# Patient Record
Sex: FEMALE | Race: WHITE
[De-identification: names, ages, dates, MRNs, and addresses within clinical notes are randomized per-mention and may not be internally consistent; named-entity substitution may affect disease eponyms.]

---

## 2020-04-25 ENCOUNTER — HOSPITAL ENCOUNTER (EMERGENCY)
Dept: HOSPITAL 76 - ED | Age: 20
Discharge: HOME | End: 2020-04-25
Payer: COMMERCIAL

## 2020-04-25 VITALS — SYSTOLIC BLOOD PRESSURE: 106 MMHG | DIASTOLIC BLOOD PRESSURE: 70 MMHG

## 2020-04-25 DIAGNOSIS — R19.7: ICD-10-CM

## 2020-04-25 DIAGNOSIS — R11.2: Primary | ICD-10-CM

## 2020-04-25 LAB
ALBUMIN DIAFP-MCNC: 5.2 G/DL (ref 3.2–5.5)
ALBUMIN/GLOB SERPL: 2.2 {RATIO} (ref 1–2.2)
ALP SERPL-CCNC: 52 IU/L (ref 42–121)
ALT SERPL W P-5'-P-CCNC: 13 IU/L (ref 10–60)
AMPHET UR QL SCN: NEGATIVE
ANION GAP SERPL CALCULATED.4IONS-SCNC: 16 MMOL/L (ref 6–13)
AST SERPL W P-5'-P-CCNC: 18 IU/L (ref 10–42)
BASOPHILS NFR BLD AUTO: 0 10^3/UL (ref 0–0.1)
BASOPHILS NFR BLD AUTO: 0.2 %
BENZODIAZ UR QL SCN: NEGATIVE
BILIRUB BLD-MCNC: 1.5 MG/DL (ref 0.2–1)
BILIRUB UR QL CFM: NEGATIVE
BUN SERPL-MCNC: 13 MG/DL (ref 6–20)
CALCIUM UR-MCNC: 9.5 MG/DL (ref 8.5–10.3)
CHLORIDE SERPL-SCNC: 103 MMOL/L (ref 101–111)
CLARITY UR REFRACT.AUTO: CLEAR
CO2 SERPL-SCNC: 20 MMOL/L (ref 21–32)
COCAINE UR-SCNC: NEGATIVE UMOL/L
CREAT SERPLBLD-SCNC: 0.6 MG/DL (ref 0.4–1)
EOSINOPHIL # BLD AUTO: 0 10^3/UL (ref 0–0.7)
EOSINOPHIL NFR BLD AUTO: 0.1 %
ERYTHROCYTE [DISTWIDTH] IN BLOOD BY AUTOMATED COUNT: 11.9 % (ref 12–15)
GLOBULIN SER-MCNC: 2.4 G/DL (ref 2.1–4.2)
GLUCOSE SERPL-MCNC: 96 MG/DL (ref 70–100)
GLUCOSE UR QL STRIP.AUTO: NEGATIVE MG/DL
HCG UR QL: NEGATIVE
HGB UR QL STRIP: 13.7 G/DL (ref 12–16)
KETONES UR QL STRIP.AUTO: >=80 MG/DL
LIPASE SERPL-CCNC: 17 U/L (ref 22–51)
LYMPHOCYTES # SPEC AUTO: 1.4 10^3/UL (ref 1.5–3.5)
LYMPHOCYTES NFR BLD AUTO: 15.7 %
MCH RBC QN AUTO: 30.3 PG (ref 27–31)
MCHC RBC AUTO-ENTMCNC: 34.8 G/DL (ref 32–36)
MCV RBC AUTO: 87.2 FL (ref 81–99)
METHADONE UR QL SCN: NEGATIVE
METHAMPHET UR QL SCN: NEGATIVE
MONOCYTES # BLD AUTO: 0.4 10^3/UL (ref 0–1)
MONOCYTES NFR BLD AUTO: 4.3 %
NEUTROPHILS # BLD AUTO: 6.9 10^3/UL (ref 1.5–6.6)
NEUTROPHILS # SNV AUTO: 8.7 X10^3/UL (ref 4.8–10.8)
NEUTROPHILS NFR BLD AUTO: 79.4 %
NITRITE UR QL STRIP.AUTO: NEGATIVE
OPIATES UR QL SCN: NEGATIVE
PDW BLD AUTO: 10.1 FL (ref 7.9–10.8)
PH UR STRIP.AUTO: 6 PH (ref 5–7.5)
PLATELET # BLD: 186 10^3/UL (ref 130–450)
PROT SPEC-MCNC: 7.6 G/DL (ref 6.7–8.2)
PROT UR STRIP.AUTO-MCNC: NEGATIVE MG/DL
RBC # UR STRIP.AUTO: NEGATIVE /UL
RBC MAR: 4.52 10^6/UL (ref 4.2–5.4)
SODIUM SERPLBLD-SCNC: 139 MMOL/L (ref 135–145)
SP GR UR STRIP.AUTO: 1.02 (ref 1–1.03)
UROBILINOGEN UR QL STRIP.AUTO: (no result) E.U./DL
UROBILINOGEN UR STRIP.AUTO-MCNC: NEGATIVE MG/DL
VOLATILE DRUGS POS SERPL SCN: (no result)

## 2020-04-25 PROCEDURE — 36415 COLL VENOUS BLD VENIPUNCTURE: CPT

## 2020-04-25 PROCEDURE — 81001 URINALYSIS AUTO W/SCOPE: CPT

## 2020-04-25 PROCEDURE — 80053 COMPREHEN METABOLIC PANEL: CPT

## 2020-04-25 PROCEDURE — 83690 ASSAY OF LIPASE: CPT

## 2020-04-25 PROCEDURE — 99284 EMERGENCY DEPT VISIT MOD MDM: CPT

## 2020-04-25 PROCEDURE — 81025 URINE PREGNANCY TEST: CPT

## 2020-04-25 PROCEDURE — 96375 TX/PRO/DX INJ NEW DRUG ADDON: CPT

## 2020-04-25 PROCEDURE — 80306 DRUG TEST PRSMV INSTRMNT: CPT

## 2020-04-25 PROCEDURE — 96365 THER/PROPH/DIAG IV INF INIT: CPT

## 2020-04-25 PROCEDURE — 87086 URINE CULTURE/COLONY COUNT: CPT

## 2020-04-25 PROCEDURE — 81003 URINALYSIS AUTO W/O SCOPE: CPT

## 2020-04-25 PROCEDURE — 85025 COMPLETE CBC W/AUTO DIFF WBC: CPT

## 2020-04-25 PROCEDURE — 99283 EMERGENCY DEPT VISIT LOW MDM: CPT

## 2020-04-25 NOTE — ED PHYSICIAN DOCUMENTATION
PD HPI ABD PAIN





- Stated complaint


Stated Complaint: N/V/D/ABD PX





- Chief complaint


Chief Complaint: Abd Pain





- History obtained from


History obtained from: Patient





- History of Present Illness


Timing - onset: Other (19-year-old woman has been sick for about 4 days with 

vomiting and diarrhea.  No significant abdominal pain.  No headaches.  She also 

notes this is making worse some chronic weight loss that she has had since she 

had a depressive episode which the depression has resolved but her weight has 

not come back.)





Review of Systems


Constitutional: denies: Fever, Chills


Nose: denies: Rhinorrhea / runny nose, Congestion


Throat: denies: Sore throat


Cardiac: denies: Chest pain / pressure, Palpitations


Respiratory: denies: Dyspnea





PD PAST MEDICAL HISTORY





- Present Medications


Home Medications: 


                                Ambulatory Orders











 Medication  Instructions  Recorded  Confirmed


 


Ondansetron Odt [Zofran] 4 mg TL Q6H PRN #10 tablet 04/25/20 














- Allergies


Allergies/Adverse Reactions: 


                                    Allergies











Allergy/AdvReac Type Severity Reaction Status Date / Time


 


No Known Drug Allergies Allergy   Verified 04/25/20 16:21














PD ED PE NORMAL





- Vitals


Vital signs reviewed: Yes





- General


General: Alert and oriented X 3, No acute distress





- HEENT


HEENT: PERRL, Pharynx benign





- Neck


Neck: Supple, no meningeal sign, No bony TTP





- Cardiac


Cardiac: RRR, No murmur





- Respiratory


Respiratory: No respiratory distress, Clear bilaterally





- Abdomen


Abdomen: Normal bowel sounds, Soft, Non tender





- Back


Back: No CVA TTP, No spinal TTP





- Derm


Derm: Normal color, Warm and dry





- Extremities


Extremities: No edema, No calf tenderness / cord





- Neuro


Neuro: Alert and oriented X 3, Normal speech





Results





- Vitals


Vitals: 


                               Vital Signs - 24 hr











  04/25/20 04/25/20





  16:14 18:55


 


Temperature 37.2 C 36.7 C


 


Heart Rate 135 H 97


 


Respiratory 18 18





Rate  


 


Blood Pressure 125/98 H 107/69


 


O2 Saturation 96 100








                                     Oxygen











O2 Source                      Room air

















- Labs


Labs: 


                                Laboratory Tests











  04/25/20 04/25/20 04/25/20





  16:47 16:47 16:55


 


WBC    8.7


 


RBC    4.52


 


Hgb    13.7


 


Hct    39.4


 


MCV    87.2


 


MCH    30.3


 


MCHC    34.8


 


RDW    11.9 L


 


Plt Count    186


 


MPV    10.1


 


Neut # (Auto)    6.9 H


 


Lymph # (Auto)    1.4 L


 


Mono # (Auto)    0.4


 


Eos # (Auto)    0.0


 


Baso # (Auto)    0.0


 


Absolute Nucleated RBC    0.00


 


Nucleated RBC %    0.0


 


Sodium   


 


Potassium   


 


Chloride   


 


Carbon Dioxide   


 


Anion Gap   


 


BUN   


 


Creatinine   


 


Estimated GFR (MDRD)   


 


Glucose   


 


Calcium   


 


Total Bilirubin   


 


AST   


 


ALT   


 


Alkaline Phosphatase   


 


Total Protein   


 


Albumin   


 


Globulin   


 


Albumin/Globulin Ratio   


 


Lipase   


 


Urine Color   DARK YELLOW 


 


Urine Clarity   CLEAR 


 


Urine pH   6.0 


 


Ur Specific Gravity   1.025 


 


Urine Protein   NEGATIVE 


 


Urine Glucose (UA)   NEGATIVE 


 


Urine Ketones   >=80 H 


 


Urine Occult Blood   NEGATIVE 


 


Urine Nitrite   NEGATIVE 


 


Urine Bilirubin   NEGATIVE 


 


Urine Urobilinogen   0.2 (NORMAL) 


 


Ur Leukocyte Esterase   NEGATIVE 


 


Ur Microscopic Review   NOT INDICATED 


 


Urine Culture Comments   NOT INDICATED 


 


Urine HCG, Qual   NEGATIVE 


 


Urine Opiates Screen  NEGATIVE  


 


Ur Oxycodone Screen  NEGATIVE  


 


Urine Methadone Screen  NEGATIVE  


 


Ur Propoxyphene Screen  NEGATIVE  


 


Ur Barbiturates Screen  NEGATIVE  


 


Ur Tricyclics Screen  NEGATIVE  


 


Ur Phencyclidine Scrn  NEGATIVE  


 


Ur Amphetamine Screen  NEGATIVE  


 


U Methamphetamines Scrn  NEGATIVE  


 


U Benzodiazepines Scrn  NEGATIVE  


 


Urine Cocaine Screen  NEGATIVE  


 


U Cannabinoids Screen  POSITIVE H  














  04/25/20





  16:55


 


WBC 


 


RBC 


 


Hgb 


 


Hct 


 


MCV 


 


MCH 


 


MCHC 


 


RDW 


 


Plt Count 


 


MPV 


 


Neut # (Auto) 


 


Lymph # (Auto) 


 


Mono # (Auto) 


 


Eos # (Auto) 


 


Baso # (Auto) 


 


Absolute Nucleated RBC 


 


Nucleated RBC % 


 


Sodium  139


 


Potassium  3.6


 


Chloride  103


 


Carbon Dioxide  20 L


 


Anion Gap  16.0 H


 


BUN  13


 


Creatinine  0.6


 


Estimated GFR (MDRD)  127


 


Glucose  96


 


Calcium  9.5


 


Total Bilirubin  1.5 H


 


AST  18


 


ALT  13


 


Alkaline Phosphatase  52


 


Total Protein  7.6


 


Albumin  5.2


 


Globulin  2.4


 


Albumin/Globulin Ratio  2.2


 


Lipase  17 L


 


Urine Color 


 


Urine Clarity 


 


Urine pH 


 


Ur Specific Gravity 


 


Urine Protein 


 


Urine Glucose (UA) 


 


Urine Ketones 


 


Urine Occult Blood 


 


Urine Nitrite 


 


Urine Bilirubin 


 


Urine Urobilinogen 


 


Ur Leukocyte Esterase 


 


Ur Microscopic Review 


 


Urine Culture Comments 


 


Urine HCG, Qual 


 


Urine Opiates Screen 


 


Ur Oxycodone Screen 


 


Urine Methadone Screen 


 


Ur Propoxyphene Screen 


 


Ur Barbiturates Screen 


 


Ur Tricyclics Screen 


 


Ur Phencyclidine Scrn 


 


Ur Amphetamine Screen 


 


U Methamphetamines Scrn 


 


U Benzodiazepines Scrn 


 


Urine Cocaine Screen 


 


U Cannabinoids Screen 














PD MEDICAL DECISION MAKING





- ED course


ED course: 





She uses marijuana daily mostly at night.  Discussed with her that that may be 

the cause of the chronic weight loss and she should abstain for a little bit.  

Otherwise her current illness seems like a kind of run-of-the-mill 

gastroenteritis, she is tachycardic and will receive IV fluids and antiemetics.





19-year-old woman presents with what sounds like gastroenteritis with nausea and

 vomiting and diarrhea.  She is a daily marijuana user and there may be some 

element of cannabinoid hyperemesis, or that seems less consistent with her 

syndrome since she really has no significant abdominal pain.  She had partial 

relief with Zofran and then complete relief of her nausea with Phenergan but did

 develop an akathisia after that requiring a little bit of Ativan.  Passed an 

oral challenge after that.





Departure





- Departure


Disposition: 01 Home, Self Care


Clinical Impression: 


Vomiting


Qualifiers:


 Vomiting type: unspecified Vomiting Intractability: non-intractable Nausea 

presence: with nausea Qualified Code(s): R11.2 - Nausea with vomiting, 

unspecified





Diarrhea


Qualifiers:


 Diarrhea type: presumed infectious Qualified Code(s): R19.7 - Diarrhea, 

unspecified





Condition: Good


Record reviewed to determine appropriate education?: Yes


Instructions:  ED Diarrhea Viral, ED Nausea Vomiting


Prescriptions: 


Ondansetron Odt [Zofran] 4 mg TL Q6H PRN #10 tablet


 PRN Reason: Nausea / Vomiting


Comments: 


Return if not better in 24 to 48 hours, anytime if worse, if pain is severe, or 

if you run a high fever.

## 2020-04-27 ENCOUNTER — HOSPITAL ENCOUNTER (EMERGENCY)
Dept: HOSPITAL 76 - ED | Age: 20
Discharge: HOME | End: 2020-04-27
Payer: COMMERCIAL

## 2020-04-27 VITALS — DIASTOLIC BLOOD PRESSURE: 69 MMHG | SYSTOLIC BLOOD PRESSURE: 101 MMHG

## 2020-04-27 DIAGNOSIS — K52.9: ICD-10-CM

## 2020-04-27 DIAGNOSIS — R11.2: ICD-10-CM

## 2020-04-27 DIAGNOSIS — E86.0: Primary | ICD-10-CM

## 2020-04-27 LAB
ALBUMIN DIAFP-MCNC: 5.6 G/DL (ref 3.2–5.5)
ALBUMIN/GLOB SERPL: 2.3 {RATIO} (ref 1–2.2)
ALP SERPL-CCNC: 50 IU/L (ref 42–121)
ALT SERPL W P-5'-P-CCNC: 13 IU/L (ref 10–60)
ANION GAP SERPL CALCULATED.4IONS-SCNC: 15 MMOL/L (ref 6–13)
AST SERPL W P-5'-P-CCNC: 18 IU/L (ref 10–42)
BASOPHILS NFR BLD AUTO: 0 10^3/UL (ref 0–0.1)
BASOPHILS NFR BLD AUTO: 0.4 %
BILIRUB BLD-MCNC: 1.6 MG/DL (ref 0.2–1)
BILIRUB UR QL CFM: NEGATIVE
BUN SERPL-MCNC: 10 MG/DL (ref 6–20)
CALCIUM UR-MCNC: 9.5 MG/DL (ref 8.5–10.3)
CHLORIDE SERPL-SCNC: 102 MMOL/L (ref 101–111)
CLARITY UR REFRACT.AUTO: CLEAR
CO2 SERPL-SCNC: 19 MMOL/L (ref 21–32)
CREAT SERPLBLD-SCNC: 0.6 MG/DL (ref 0.4–1)
EOSINOPHIL # BLD AUTO: 0 10^3/UL (ref 0–0.7)
EOSINOPHIL NFR BLD AUTO: 0.2 %
ERYTHROCYTE [DISTWIDTH] IN BLOOD BY AUTOMATED COUNT: 11.9 % (ref 12–15)
GLOBULIN SER-MCNC: 2.4 G/DL (ref 2.1–4.2)
GLUCOSE SERPL-MCNC: 78 MG/DL (ref 70–100)
GLUCOSE UR QL STRIP.AUTO: NEGATIVE MG/DL
HCG UR QL: NEGATIVE
HGB UR QL STRIP: 13.7 G/DL (ref 12–16)
KETONES UR QL STRIP.AUTO: >=80 MG/DL
LIPASE SERPL-CCNC: 19 U/L (ref 22–51)
LYMPHOCYTES # SPEC AUTO: 1.4 10^3/UL (ref 1.5–3.5)
LYMPHOCYTES NFR BLD AUTO: 28.2 %
MCH RBC QN AUTO: 30.4 PG (ref 27–31)
MCHC RBC AUTO-ENTMCNC: 34.7 G/DL (ref 32–36)
MCV RBC AUTO: 87.6 FL (ref 81–99)
MONOCYTES # BLD AUTO: 0.3 10^3/UL (ref 0–1)
MONOCYTES NFR BLD AUTO: 6.9 %
NEUTROPHILS # BLD AUTO: 3.2 10^3/UL (ref 1.5–6.6)
NEUTROPHILS # SNV AUTO: 5 X10^3/UL (ref 4.8–10.8)
NEUTROPHILS NFR BLD AUTO: 63.9 %
NITRITE UR QL STRIP.AUTO: NEGATIVE
PDW BLD AUTO: 10.4 FL (ref 7.9–10.8)
PH UR STRIP.AUTO: 6 PH (ref 5–7.5)
PLATELET # BLD: 178 10^3/UL (ref 130–450)
PROT SPEC-MCNC: 8 G/DL (ref 6.7–8.2)
PROT UR STRIP.AUTO-MCNC: (no result) MG/DL
RBC # UR STRIP.AUTO: (no result) /UL
RBC MAR: 4.51 10^6/UL (ref 4.2–5.4)
SODIUM SERPLBLD-SCNC: 136 MMOL/L (ref 135–145)
SP GR UR STRIP.AUTO: >=1.03 (ref 1–1.03)
UROBILINOGEN UR QL STRIP.AUTO: (no result) E.U./DL
UROBILINOGEN UR STRIP.AUTO-MCNC: NEGATIVE MG/DL

## 2020-04-27 PROCEDURE — 85025 COMPLETE CBC W/AUTO DIFF WBC: CPT

## 2020-04-27 PROCEDURE — 81001 URINALYSIS AUTO W/SCOPE: CPT

## 2020-04-27 PROCEDURE — 81003 URINALYSIS AUTO W/O SCOPE: CPT

## 2020-04-27 PROCEDURE — 83690 ASSAY OF LIPASE: CPT

## 2020-04-27 PROCEDURE — 96375 TX/PRO/DX INJ NEW DRUG ADDON: CPT

## 2020-04-27 PROCEDURE — 74177 CT ABD & PELVIS W/CONTRAST: CPT

## 2020-04-27 PROCEDURE — 96361 HYDRATE IV INFUSION ADD-ON: CPT

## 2020-04-27 PROCEDURE — 36415 COLL VENOUS BLD VENIPUNCTURE: CPT

## 2020-04-27 PROCEDURE — 99284 EMERGENCY DEPT VISIT MOD MDM: CPT

## 2020-04-27 PROCEDURE — 81025 URINE PREGNANCY TEST: CPT

## 2020-04-27 PROCEDURE — 87086 URINE CULTURE/COLONY COUNT: CPT

## 2020-04-27 PROCEDURE — 96374 THER/PROPH/DIAG INJ IV PUSH: CPT

## 2020-04-27 PROCEDURE — 80053 COMPREHEN METABOLIC PANEL: CPT

## 2020-04-27 RX ADMIN — SODIUM CHLORIDE STA MG: 9 INJECTION, SOLUTION INTRAVENOUS at 18:33

## 2020-04-27 RX ADMIN — SODIUM CHLORIDE STA MLS/HR: 9 INJECTION, SOLUTION INTRAVENOUS at 18:33

## 2020-04-27 RX ADMIN — ONDANSETRON STA MG: 2 INJECTION INTRAMUSCULAR; INTRAVENOUS at 18:33

## 2020-04-27 RX ADMIN — SODIUM CHLORIDE STA MLS/HR: 9 INJECTION, SOLUTION INTRAVENOUS at 19:38

## 2020-04-27 RX ADMIN — IOVERSOL ONE ML: 678 INJECTION INTRA-ARTERIAL; INTRAVENOUS at 18:50

## 2020-04-27 RX ADMIN — SODIUM CHLORIDE ONE MLS/HR: 9 INJECTION, SOLUTION INTRAVENOUS at 18:55

## 2020-04-27 NOTE — CT REPORT
Reason:  diffuse abd pain, vomiting x 6 days

Procedure Date:  04/27/2020   

Accession Number:  [id number] / [id number]                    

Procedure:  CT  - Abdomen/Pelvis W CPT Code:  

 

***Final Report***

 

 

FULL RESULT:

 

 

EXAM: CT ABDOMEN AND PELVIS

 

EXAM DATE: 4/27/2020 06:46 PM.

 

CLINICAL HISTORY: Diffuse abd pain, vomiting x 6 days.

 

COMPARISONS: None.

 

TECHNIQUE: Routine helical CT imaging was performed through the abdomen 

and pelvis. IV contrast: OPTIRAY 320. Enteric contrast: No. 

Reconstructions: Coronal and sagittal.

 

In accordance with CT protocol optimization, one or more of the following 

dose reduction techniques were utilized for this exam: automated exposure 

control, adjustment of mA and/or KV based on patient size, or use of 

iterative reconstructive technique.

 

FINDINGS:

 

Lung Bases: Unremarkable.

 

Liver: Normal. No masses.

 

Gallbladder/Bile Ducts: Unremarkable.

 

Spleen: Normal.

 

Pancreas: Normal.

 

Adrenal Glands: Normal.

 

Kidneys: Normal. No masses or hydronephrosis.

 

Peritoneal Cavity/Bowel: Suggestion of submucosal edema and colonic wall 

thickening involving mid ascending colon with mild pericolonic fat 

stranding (image 16 on series 5). Findings suggestive of colitis. 

Differentials include ulcerative colitis versus 

infectious/pseudomembranous colitis.

 

 Appendix not well seen, however there is no inflammation in right lower 

quadrant.

 

Pelvic Organs: Normal. The bladder and visualized pelvic organs are 

within normal limits.

 

Vasculature: No aneurysms or other significant abnormality.

 

Bones: No significant abnormality.

 

Other: None.

IMPRESSION: Suggestion of submucosal edema and colonic wall thickening 

involving mid ascending colon with mild pericolonic fat stranding, 

suggestive of colitis. Differentials include ulcerative colitis versus 

infectious/pseudomembranous colitis.

 

[facility]

## 2020-04-27 NOTE — ED PHYSICIAN DOCUMENTATION
History of Present Illness





- Stated complaint


Stated Complaint: N/V





- Chief complaint


Chief Complaint: Abd Pain





- History obtained from


History obtained from: Patient





- History of Present Illness


Timing: How many days ago (6)


Pain level max: 4


Pain level now: 3





- Additonal information


Additional information: 





19-year-old female presents to the emergency department with vomiting for the 

past 6 days.  She has had some diarrhea as well.  Zofran does seem to help 

mildly, but is still having difficulty keeping things down.  No recent travel.  

No recent antibiotics.  No history of inflammatory bowel disease in the family. 

Denies any possibility of pregnancy.  No fevers. She states that she utilizes 

marijuana approximately once per month





Review of Systems


Ten Systems: 10 systems reviewed and negative


Constitutional: denies: Fever, Chills


Ears: denies: Ear pain


Nose: denies: Rhinorrhea / runny nose, Congestion


Respiratory: denies: Cough


GI: reports: Nausea, Vomiting.  denies: Abdominal Pain


: denies: Dysuria, Frequency, Hesitancy


Skin: denies: Rash


Musculoskeletal: denies: Neck pain, Back pain


Neurologic: denies: Headache





PD PAST MEDICAL HISTORY





- Past Medical History


Past Medical History: No





- Past Surgical History


Past Surgical History: No





- Present Medications


Home Medications: 


                                Ambulatory Orders











 Medication  Instructions  Recorded  Confirmed


 


Ondansetron Odt [Zofran] 4 mg TL Q6H PRN #10 tablet 04/25/20 


 


Amox/Clav 875/125 [Augmentin] 1 each PO Q12H #20 tablet 04/27/20 


 


Ondansetron Odt [Zofran] 4 mg TL Q6H PRN #10 tablet 04/27/20 














- Allergies


Allergies/Adverse Reactions: 


                                    Allergies











Allergy/AdvReac Type Severity Reaction Status Date / Time


 


No Known Drug Allergies Allergy   Verified 04/27/20 17:41














- Social History


Does the pt smoke?: No


Smoking Status: Never smoker


Does the pt drink ETOH?: Yes


Does the pt have substance abuse?: No





- Immunizations


Immunizations are current?: Yes





- POLST


Patient has POLST: No





PD ED PE NORMAL





- Vitals


Vital signs reviewed: Yes





- General


General: Alert and oriented X 3, Well developed/nourished, Other





- HEENT


HEENT: PERRL, Pharynx benign, Other (Dry lips)





- Neck


Neck: Supple, no meningeal sign





- Cardiac


Cardiac: RRR





- Respiratory


Respiratory: No respiratory distress, Clear bilaterally





- Abdomen


Abdomen: Normal bowel sounds, Soft, Non distended, Other (Mild diffuse 

tenderness to palpation without peritoneal signs)





- Back


Back: No CVA TTP, No spinal TTP





- Derm


Derm: Warm and dry





- Extremities


Extremities: No edema, No calf tenderness / cord





- Neuro


Neuro: Alert and oriented X 3





- Psych


Psych: Normal mood, Normal affect





Results





- Vitals


Vitals: 


                               Vital Signs - 24 hr











  04/27/20 04/27/20





  17:41 17:43


 


Temperature 36.5 C 


 


Heart Rate 116 H 68


 


Respiratory 18 16





Rate  


 


Blood Pressure 108/64 112/68


 


O2 Saturation 98 100








                                     Oxygen











O2 Source                      Room air

















- Labs


Labs: 


                                Laboratory Tests











  04/27/20 04/27/20 04/27/20





  17:53 17:53 18:15


 


WBC  5.0  


 


RBC  4.51  


 


Hgb  13.7  


 


Hct  39.5  


 


MCV  87.6  


 


MCH  30.4  


 


MCHC  34.7  


 


RDW  11.9 L  


 


Plt Count  178  


 


MPV  10.4  


 


Neut # (Auto)  3.2  


 


Lymph # (Auto)  1.4 L  


 


Mono # (Auto)  0.3  


 


Eos # (Auto)  0.0  


 


Baso # (Auto)  0.0  


 


Absolute Nucleated RBC  0.00  


 


Nucleated RBC %  0.0  


 


Sodium   136 


 


Potassium   3.8 


 


Chloride   102 


 


Carbon Dioxide   19 L 


 


Anion Gap   15.0 H 


 


BUN   10 


 


Creatinine   0.6 


 


Estimated GFR (MDRD)   127 


 


Glucose   78 


 


Calcium   9.5 


 


Total Bilirubin   1.6 H 


 


AST   18 


 


ALT   13 


 


Alkaline Phosphatase   50 


 


Total Protein   8.0 


 


Albumin   5.6 H 


 


Globulin   2.4 


 


Albumin/Globulin Ratio   2.3 H 


 


Lipase   19 L 


 


Urine Color    LT. YELLOW


 


Urine Clarity    CLEAR


 


Urine pH    6.0


 


Ur Specific Gravity    >=1.030 H


 


Urine Protein    TRACE


 


Urine Glucose (UA)    NEGATIVE


 


Urine Ketones    >=80 H


 


Urine Occult Blood    TRACE-INTA


 


Urine Nitrite    NEGATIVE


 


Urine Bilirubin    NEGATIVE


 


Urine Urobilinogen    0.2 (NORMAL)


 


Ur Leukocyte Esterase    NEGATIVE


 


Ur Microscopic Review    NOT INDICATED


 


Urine Culture Comments    NOT INDICATED


 


Urine HCG, Qual    NEGATIVE














- Rads (name of study)


  ** CT abdomen pelvis


Radiology: Prelim report reviewed, EMP read contemporaneously, See rad report 

(Suggestion of submucosal edema and colonic wall thickening involving mid 

ascending colon with mild pericolonic fat stranding, suggestive of colitis. 

Differentials include ulcerative colitis versus infectious/pseudomembranous 

colitis. )





PD MEDICAL DECISION MAKING





- ED course


Complexity details: reviewed old records, reviewed results, re-evaluated 

patient, considered differential, d/w patient


ED course: 





Patient feels much better after IV fluids, Zofran and a small dose of Ativan.  

She was also given a dose of Unasyn for possible infectious colitis.  We will 

place her on Augmentin for home.  She is well-appearing, nontoxic.  Afebrile.  

Also spoke with her mother on the phone who is in [place].  No other significant

 abnormalities.  Patient counseled regarding signs and symptoms for which I 

believe and urgent re-evaluation would be necessary. Patient with good 

understanding of and agreement to plan and is comfortable going home at this 

time





This document was made in part using voice recognition software. While efforts 

are made to proofread this document, sound alike and grammatical errors may 

occur.





Departure





- Departure


Disposition: 01 Home, Self Care


Clinical Impression: 


 Dehydration, Colitis





Vomiting


Qualifiers:


 Vomiting type: unspecified Vomiting Intractability: unspecified Nausea 

presence: with nausea Qualified Code(s): R11.2 - Nausea with vomiting, u

nspecified





Condition: Good


Instructions:  ED Dehydration, ED Diet Vomiting Diarrhea, ED Nausea Vomiting


Follow-Up: 


your,doctor in 1week [Other]


Prescriptions: 


Amox/Clav 875/125 [Augmentin] 1 each PO Q12H #20 tablet


Ondansetron Odt [Zofran] 4 mg TL Q6H PRN #10 tablet


 PRN Reason: Nausea / Vomiting


Comments: 


Return if you worsen.  Follow-up with your doctor for further care.  Drink 

plenty of fluids.  We will start you on the antibiotics to see if this helps her

 symptoms.  This also could be something such as ulcerative colitis which is an 

inflammatory bowel disease.  If your symptoms do not improve, this diagnosis may

 be explored further with your doctor.

## 2020-05-02 ENCOUNTER — HOSPITAL ENCOUNTER (EMERGENCY)
Dept: HOSPITAL 76 - ED | Age: 20
Discharge: HOME | End: 2020-05-02
Payer: COMMERCIAL

## 2020-05-02 VITALS — DIASTOLIC BLOOD PRESSURE: 71 MMHG | SYSTOLIC BLOOD PRESSURE: 102 MMHG

## 2020-05-02 DIAGNOSIS — E86.0: Primary | ICD-10-CM

## 2020-05-02 DIAGNOSIS — R11.2: ICD-10-CM

## 2020-05-02 DIAGNOSIS — R19.7: ICD-10-CM

## 2020-05-02 LAB
ALBUMIN DIAFP-MCNC: 5 G/DL (ref 3.2–5.5)
ALBUMIN/GLOB SERPL: 2.1 {RATIO} (ref 1–2.2)
ALP SERPL-CCNC: 45 IU/L (ref 42–121)
ALT SERPL W P-5'-P-CCNC: 12 IU/L (ref 10–60)
AMPHET UR QL SCN: NEGATIVE
ANION GAP SERPL CALCULATED.4IONS-SCNC: 13 MMOL/L (ref 6–13)
AST SERPL W P-5'-P-CCNC: 16 IU/L (ref 10–42)
BASOPHILS NFR BLD AUTO: 0 10^3/UL (ref 0–0.1)
BASOPHILS NFR BLD AUTO: 0.3 %
BENZODIAZ UR QL SCN: NEGATIVE
BILIRUB BLD-MCNC: 0.8 MG/DL (ref 0.2–1)
BUN SERPL-MCNC: 7 MG/DL (ref 6–20)
CALCIUM UR-MCNC: 9.2 MG/DL (ref 8.5–10.3)
CHLORIDE SERPL-SCNC: 104 MMOL/L (ref 101–111)
CLARITY UR REFRACT.AUTO: CLEAR
CO2 SERPL-SCNC: 18 MMOL/L (ref 21–32)
COCAINE UR-SCNC: NEGATIVE UMOL/L
CREAT SERPLBLD-SCNC: 0.4 MG/DL (ref 0.4–1)
CRP SERPL-MCNC: < 1 MG/DL (ref 0–1)
EOSINOPHIL # BLD AUTO: 0 10^3/UL (ref 0–0.7)
EOSINOPHIL NFR BLD AUTO: 0.3 %
ERYTHROCYTE [DISTWIDTH] IN BLOOD BY AUTOMATED COUNT: 12.3 % (ref 12–15)
GLOBULIN SER-MCNC: 2.4 G/DL (ref 2.1–4.2)
GLUCOSE SERPL-MCNC: 95 MG/DL (ref 70–100)
GLUCOSE UR QL STRIP.AUTO: NEGATIVE MG/DL
HCG UR QL: NEGATIVE
HGB UR QL STRIP: 14.7 G/DL (ref 12–16)
KETONES UR QL STRIP.AUTO: (no result) MG/DL
LIPASE SERPL-CCNC: 28 U/L (ref 22–51)
LYMPHOCYTES # SPEC AUTO: 1.8 10^3/UL (ref 1.5–3.5)
LYMPHOCYTES NFR BLD AUTO: 24 %
MCH RBC QN AUTO: 30.4 PG (ref 27–31)
MCHC RBC AUTO-ENTMCNC: 35.5 G/DL (ref 32–36)
MCV RBC AUTO: 85.7 FL (ref 81–99)
METHADONE UR QL SCN: NEGATIVE
METHAMPHET UR QL SCN: NEGATIVE
MONOCYTES # BLD AUTO: 0.5 10^3/UL (ref 0–1)
MONOCYTES NFR BLD AUTO: 7 %
NEUTROPHILS # BLD AUTO: 5.1 10^3/UL (ref 1.5–6.6)
NEUTROPHILS # SNV AUTO: 7.5 X10^3/UL (ref 4.8–10.8)
NEUTROPHILS NFR BLD AUTO: 68.1 %
NITRITE UR QL STRIP.AUTO: NEGATIVE
OPIATES UR QL SCN: NEGATIVE
PDW BLD AUTO: 10.8 FL (ref 7.9–10.8)
PH UR STRIP.AUTO: 6.5 PH (ref 5–7.5)
PLATELET # BLD: 236 10^3/UL (ref 130–450)
PROT SPEC-MCNC: 7.4 G/DL (ref 6.7–8.2)
PROT UR STRIP.AUTO-MCNC: NEGATIVE MG/DL
RBC # UR STRIP.AUTO: NEGATIVE /UL
RBC MAR: 4.83 10^6/UL (ref 4.2–5.4)
SODIUM SERPLBLD-SCNC: 135 MMOL/L (ref 135–145)
SP GR UR STRIP.AUTO: <=1.005 (ref 1–1.03)
UROBILINOGEN UR QL STRIP.AUTO: (no result) E.U./DL
UROBILINOGEN UR STRIP.AUTO-MCNC: NEGATIVE MG/DL
VOLATILE DRUGS POS SERPL SCN: (no result)

## 2020-05-02 PROCEDURE — 81001 URINALYSIS AUTO W/SCOPE: CPT

## 2020-05-02 PROCEDURE — 85025 COMPLETE CBC W/AUTO DIFF WBC: CPT

## 2020-05-02 PROCEDURE — 87086 URINE CULTURE/COLONY COUNT: CPT

## 2020-05-02 PROCEDURE — 83993 ASSAY FOR CALPROTECTIN FECAL: CPT

## 2020-05-02 PROCEDURE — 87493 C DIFF AMPLIFIED PROBE: CPT

## 2020-05-02 PROCEDURE — 80306 DRUG TEST PRSMV INSTRMNT: CPT

## 2020-05-02 PROCEDURE — 85651 RBC SED RATE NONAUTOMATED: CPT

## 2020-05-02 PROCEDURE — 83630 LACTOFERRIN FECAL (QUAL): CPT

## 2020-05-02 PROCEDURE — 80053 COMPREHEN METABOLIC PANEL: CPT

## 2020-05-02 PROCEDURE — 81003 URINALYSIS AUTO W/O SCOPE: CPT

## 2020-05-02 PROCEDURE — 36415 COLL VENOUS BLD VENIPUNCTURE: CPT

## 2020-05-02 PROCEDURE — 87046 STOOL CULTR AEROBIC BACT EA: CPT

## 2020-05-02 PROCEDURE — 96374 THER/PROPH/DIAG INJ IV PUSH: CPT

## 2020-05-02 PROCEDURE — 99284 EMERGENCY DEPT VISIT MOD MDM: CPT

## 2020-05-02 PROCEDURE — 87045 FECES CULTURE AEROBIC BACT: CPT

## 2020-05-02 PROCEDURE — 81025 URINE PREGNANCY TEST: CPT

## 2020-05-02 PROCEDURE — 83690 ASSAY OF LIPASE: CPT

## 2020-05-02 PROCEDURE — 86140 C-REACTIVE PROTEIN: CPT

## 2020-05-02 NOTE — ED PHYSICIAN DOCUMENTATION
PD HPI NVD





- Stated complaint


Stated Complaint: V/D/ABD PX





- Chief complaint


Chief Complaint: Abd Pain





- History obtained from


History obtained from: Patient





- History of Present Illness


Timing - onset: How many days ago (11)


Timing - duration: Days (11)


Timing - details: Gradual onset


Pain level max: 4


Pain level now: 3


Associated symptoms: Abdominal pain (Crampy, diffuse).  No: Fever, Chest pain, 

Hematemesis, Melena


Contributing factors: No: Sick contact, Bad food, Travel, Recent antibiotics, 

Alcohol use, Anticoagulated, Diabetes


Improved by: Meds (Zofran seems to help)


Worsened by: Eating





- Additonal information


Additional information: 





Patient with nausea, vomiting, diarrhea for 11 days.  It seems to wax and wane. 

No blood in either.  Last time she was seen here, she had a CT scan which 

revealed a possible colitis, trialed on antibiotics but does not feel like she 

has improved.  Continues to have intermittent nausea and pain.  No fevers.  No 

recent travel.  No blood in the stool.





Review of Systems


Ten Systems: 10 systems reviewed and negative


Constitutional: denies: Fever, Chills


Respiratory: denies: Cough


GI: reports: Vomiting, Diarrhea.  denies: Hematemesis, Bloody / black stool


: denies: Dysuria, Frequency, Hesitancy, Now pregnant EGA


Skin: denies: Rash


Musculoskeletal: denies: Neck pain, Back pain


Neurologic: denies: Headache





PD PAST MEDICAL HISTORY





- Past Medical History


Past Medical History: No





- Past Surgical History


Past Surgical History: No





- Present Medications


Home Medications: 


                                Ambulatory Orders











 Medication  Instructions  Recorded  Confirmed


 


Amox/Clav 875/125 [Augmentin] 1 each PO Q12H #20 tablet 04/27/20 


 


Ondansetron Odt [Zofran] 4 mg TL Q6H PRN #10 tablet 04/27/20 


 


Ondansetron Odt [Zofran] 4 mg TL Q6H PRN #20 tablet 05/02/20 














- Allergies


Allergies/Adverse Reactions: 


                                    Allergies











Allergy/AdvReac Type Severity Reaction Status Date / Time


 


No Known Drug Allergies Allergy   Verified 05/02/20 17:51














- Social History


Does the pt smoke?: No


Smoking Status: Never smoker


Does the pt drink ETOH?: Yes


Does the pt have substance abuse?: No





- Immunizations


Immunizations are current?: Yes





- POLST


Patient has POLST: No





PD ED PE NORMAL





- Vitals


Vital signs reviewed: Yes





- General


General: Alert and oriented X 3, No acute distress, Well developed/nourished





- HEENT


HEENT: PERRL, Moist mucous membranes





- Neck


Neck: Supple, no meningeal sign





- Cardiac


Cardiac: RRR, Strong equal pulses





- Respiratory


Respiratory: No respiratory distress, Clear bilaterally





- Abdomen


Abdomen: Soft, Non distended, Other (Mild diffuse tenderness without peritoneal 

signs)





- Derm


Derm: Warm and dry, No rash





- Extremities


Extremities: No edema, No calf tenderness / cord





- Neuro


Neuro: Alert and oriented X 3, CNs 2-12 intact, No motor deficit, No sensory 

deficit, Normal speech





- Psych


Psych: Normal mood, Normal affect





Results





- Vitals


Vitals: 


                               Vital Signs - 24 hr











  05/02/20 05/02/20 05/02/20





  17:47 17:51 18:52


 


Temperature 36.8 C  


 


Heart Rate 101 H 100 78


 


Respiratory 16 16 16





Rate   


 


Blood Pressure 120/80 112/65 121/63


 


O2 Saturation 96 96 98














  05/02/20 05/02/20





  18:59 20:37


 


Temperature  37.2 C


 


Heart Rate 83 86


 


Respiratory 16 20





Rate  


 


Blood Pressure 105/62 102/71


 


O2 Saturation 98 98








                                     Oxygen











O2 Source                      Room air

















- Labs


Labs: 


                                  Microbiology











 05/02/20 19:30 Campylobacter Antigen Assay - Final





 Stool 








                                Laboratory Tests











  05/02/20 05/02/20 05/02/20





  18:02 18:02 18:15


 


WBC    7.5


 


RBC    4.83


 


Hgb    14.7


 


Hct    41.4


 


MCV    85.7


 


MCH    30.4


 


MCHC    35.5


 


RDW    12.3


 


Plt Count    236


 


MPV    10.8


 


Neut # (Auto)    5.1


 


Lymph # (Auto)    1.8


 


Mono # (Auto)    0.5


 


Eos # (Auto)    0.0


 


Baso # (Auto)    0.0


 


Absolute Nucleated RBC    0.00


 


Nucleated RBC %    0.0


 


ESR   


 


Sodium   


 


Potassium   


 


Chloride   


 


Carbon Dioxide   


 


Anion Gap   


 


BUN   


 


Creatinine   


 


Estimated GFR (MDRD)   


 


Glucose   


 


Calcium   


 


Total Bilirubin   


 


AST   


 


ALT   


 


Alkaline Phosphatase   


 


C-Reactive Protein   


 


Total Protein   


 


Albumin   


 


Globulin   


 


Albumin/Globulin Ratio   


 


Lipase   


 


Urine Color  YELLOW  


 


Urine Clarity  CLEAR  


 


Urine pH  6.5  


 


Ur Specific Gravity  <=1.005  


 


Urine Protein  NEGATIVE  


 


Urine Glucose (UA)  NEGATIVE  


 


Urine Ketones  TRACE  


 


Urine Occult Blood  NEGATIVE  


 


Urine Nitrite  NEGATIVE  


 


Urine Bilirubin  NEGATIVE  


 


Urine Urobilinogen  0.2 (NORMAL)  


 


Ur Leukocyte Esterase  NEGATIVE  


 


Ur Microscopic Review  NOT INDICATED  


 


Urine Culture Comments  NOT INDICATED  


 


Urine HCG, Qual  NEGATIVE  


 


Stool Leukocytes, Qual   


 


Urine Opiates Screen   NEGATIVE 


 


Ur Oxycodone Screen   NEGATIVE 


 


Urine Methadone Screen   NEGATIVE 


 


Ur Propoxyphene Screen   NEGATIVE 


 


Ur Barbiturates Screen   NEGATIVE 


 


Ur Tricyclics Screen   NEGATIVE 


 


Ur Phencyclidine Scrn   NEGATIVE 


 


Ur Amphetamine Screen   NEGATIVE 


 


U Methamphetamines Scrn   NEGATIVE 


 


U Benzodiazepines Scrn   NEGATIVE 


 


Urine Cocaine Screen   NEGATIVE 


 


U Cannabinoids Screen   POSITIVE H 














  05/02/20 05/02/20 05/02/20





  18:15 18:15 19:30


 


WBC   


 


RBC   


 


Hgb   


 


Hct   


 


MCV   


 


MCH   


 


MCHC   


 


RDW   


 


Plt Count   


 


MPV   


 


Neut # (Auto)   


 


Lymph # (Auto)   


 


Mono # (Auto)   


 


Eos # (Auto)   


 


Baso # (Auto)   


 


Absolute Nucleated RBC   


 


Nucleated RBC %   


 


ESR   1 


 


Sodium  135  


 


Potassium  3.5  


 


Chloride  104  


 


Carbon Dioxide  18 L  


 


Anion Gap  13.0  


 


BUN  7  


 


Creatinine  0.4  


 


Estimated GFR (MDRD)  203  


 


Glucose  95  


 


Calcium  9.2  


 


Total Bilirubin  0.8  


 


AST  16  


 


ALT  12  


 


Alkaline Phosphatase  45  


 


C-Reactive Protein  < 1.0  


 


Total Protein  7.4  


 


Albumin  5.0  


 


Globulin  2.4  


 


Albumin/Globulin Ratio  2.1  


 


Lipase  28  


 


Urine Color   


 


Urine Clarity   


 


Urine pH   


 


Ur Specific Gravity   


 


Urine Protein   


 


Urine Glucose (UA)   


 


Urine Ketones   


 


Urine Occult Blood   


 


Urine Nitrite   


 


Urine Bilirubin   


 


Urine Urobilinogen   


 


Ur Leukocyte Esterase   


 


Ur Microscopic Review   


 


Urine Culture Comments   


 


Urine HCG, Qual   


 


Stool Leukocytes, Qual    NEGATIVE


 


Urine Opiates Screen   


 


Ur Oxycodone Screen   


 


Urine Methadone Screen   


 


Ur Propoxyphene Screen   


 


Ur Barbiturates Screen   


 


Ur Tricyclics Screen   


 


Ur Phencyclidine Scrn   


 


Ur Amphetamine Screen   


 


U Methamphetamines Scrn   


 


U Benzodiazepines Scrn   


 


Urine Cocaine Screen   


 


U Cannabinoids Screen   














PD MEDICAL DECISION MAKING





- ED course


Complexity details: reviewed old records, reviewed results, re-evaluated 

patient, considered differential, d/w patient


ED course: 





Patient with nausea, vomiting, diarrhea Of unclear etiology. Patient feels 

better after IV fluids.  Unclear etiology of her symptoms.  Inflammatory markers

 are negative. She is very well-appearing, nontoxic.  A stool sample was sent 

for stool culture, C. difficile, fecal leukocytes.  So further testing is 

negative.  She states she feels much better and request to go home at this time.

  We will have her follow-up with her doctor for further care.  Patient 

counseled regarding signs and symptoms for which I believe and urgent re-

evaluation would be necessary. Patient with good understanding of and agreement 

to plan and is comfortable going home at this time





This document was made in part using voice recognition software. While efforts 

are made to proofread this document, sound alike and grammatical errors may 

occur.





Departure





- Departure


Disposition: 01 Home, Self Care


Clinical Impression: 


 Dehydration





Diarrhea


Qualifiers:


 Diarrhea type: unspecified type Qualified Code(s): R19.7 - Diarrhea, unspecifi

ed





Vomiting


Qualifiers:


 Vomiting type: unspecified Vomiting Intractability: non-intractable Nausea 

presence: with nausea Qualified Code(s): R11.2 - Nausea with vomiting, 

unspecified





Condition: Good


Instructions:  ED Nausea Vomiting


Follow-Up: 


Your,doctor in 1 week [Other]


Prescriptions: 


Ondansetron Odt [Zofran] 4 mg TL Q6H PRN #20 tablet


 PRN Reason: Nausea / Vomiting


Comments: 


The cause of your symptoms is unclear today.  Continue to drink plenty of fluids

 at home.  We will follow-up on your culture results and call you if they are 

positive.  Your inflammatory markers are negative today.  It is important that 

you follow-up with your doctor for further care.


Forms:  Activity restrictions


Discharge Date/Time: 05/02/20 20:47

## 2020-07-12 ENCOUNTER — HOSPITAL ENCOUNTER (EMERGENCY)
Dept: HOSPITAL 76 - ED | Age: 20
Discharge: HOME | End: 2020-07-12
Payer: COMMERCIAL

## 2020-07-12 VITALS — DIASTOLIC BLOOD PRESSURE: 65 MMHG | SYSTOLIC BLOOD PRESSURE: 110 MMHG

## 2020-07-12 DIAGNOSIS — E86.0: ICD-10-CM

## 2020-07-12 DIAGNOSIS — R11.2: Primary | ICD-10-CM

## 2020-07-12 DIAGNOSIS — R10.84: ICD-10-CM

## 2020-07-12 DIAGNOSIS — R19.7: ICD-10-CM

## 2020-07-12 DIAGNOSIS — R63.4: ICD-10-CM

## 2020-07-12 LAB
ALBUMIN DIAFP-MCNC: 4.9 G/DL (ref 3.2–5.5)
ALBUMIN/GLOB SERPL: 2.1 {RATIO} (ref 1–2.2)
ALP SERPL-CCNC: 53 IU/L (ref 42–121)
ALT SERPL W P-5'-P-CCNC: 26 IU/L (ref 10–60)
ANION GAP SERPL CALCULATED.4IONS-SCNC: 15 MMOL/L (ref 6–13)
AST SERPL W P-5'-P-CCNC: 19 IU/L (ref 10–42)
BASOPHILS NFR BLD AUTO: 0 10^3/UL (ref 0–0.1)
BASOPHILS NFR BLD AUTO: 0.5 %
BILIRUB BLD-MCNC: 1.1 MG/DL (ref 0.2–1)
BUN SERPL-MCNC: 17 MG/DL (ref 6–20)
CALCIUM UR-MCNC: 9.6 MG/DL (ref 8.5–10.3)
CHLORIDE SERPL-SCNC: 102 MMOL/L (ref 101–111)
CLARITY UR REFRACT.AUTO: CLEAR
CO2 SERPL-SCNC: 22 MMOL/L (ref 21–32)
CREAT SERPLBLD-SCNC: 0.6 MG/DL (ref 0.4–1)
CRP SERPL-MCNC: < 1 MG/DL (ref 0–1)
EOSINOPHIL # BLD AUTO: 0 10^3/UL (ref 0–0.7)
EOSINOPHIL NFR BLD AUTO: 0.3 %
ERYTHROCYTE [DISTWIDTH] IN BLOOD BY AUTOMATED COUNT: 12.1 % (ref 12–15)
GLOBULIN SER-MCNC: 2.3 G/DL (ref 2.1–4.2)
GLUCOSE SERPL-MCNC: 88 MG/DL (ref 70–100)
GLUCOSE UR QL STRIP.AUTO: NEGATIVE MG/DL
HCG UR QL: NEGATIVE
HGB UR QL STRIP: 13.8 G/DL (ref 12–16)
KETONES UR QL STRIP.AUTO: (no result) MG/DL
LIPASE SERPL-CCNC: 24 U/L (ref 22–51)
LYMPHOCYTES # SPEC AUTO: 1.3 10^3/UL (ref 1.5–3.5)
LYMPHOCYTES NFR BLD AUTO: 20.2 %
MAGNESIUM SERPL-MCNC: 2.3 MG/DL (ref 1.7–2.8)
MCH RBC QN AUTO: 30.5 PG (ref 27–31)
MCHC RBC AUTO-ENTMCNC: 34.2 G/DL (ref 32–36)
MCV RBC AUTO: 89 FL (ref 81–99)
MONOCYTES # BLD AUTO: 0.3 10^3/UL (ref 0–1)
MONOCYTES NFR BLD AUTO: 4.3 %
NEUTROPHILS # BLD AUTO: 4.6 10^3/UL (ref 1.5–6.6)
NEUTROPHILS # SNV AUTO: 6.2 X10^3/UL (ref 4.8–10.8)
NEUTROPHILS NFR BLD AUTO: 74.4 %
NITRITE UR QL STRIP.AUTO: NEGATIVE
PDW BLD AUTO: 9.4 FL (ref 7.9–10.8)
PH UR STRIP.AUTO: 5.5 PH (ref 5–7.5)
PLATELET # BLD: 230 10^3/UL (ref 130–450)
PROT SPEC-MCNC: 7.2 G/DL (ref 6.7–8.2)
PROT UR STRIP.AUTO-MCNC: NEGATIVE MG/DL
RBC # UR STRIP.AUTO: (no result) /UL
RBC MAR: 4.53 10^6/UL (ref 4.2–5.4)
SODIUM SERPLBLD-SCNC: 139 MMOL/L (ref 135–145)
SP GR UR STRIP.AUTO: >=1.03 (ref 1–1.03)
SP GR UR STRIP.AUTO: >=1.03 (ref 1–1.03)
UROBILINOGEN UR QL STRIP.AUTO: (no result) E.U./DL
UROBILINOGEN UR STRIP.AUTO-MCNC: NEGATIVE MG/DL

## 2020-07-12 PROCEDURE — 85651 RBC SED RATE NONAUTOMATED: CPT

## 2020-07-12 PROCEDURE — 99284 EMERGENCY DEPT VISIT MOD MDM: CPT

## 2020-07-12 PROCEDURE — 83630 LACTOFERRIN FECAL (QUAL): CPT

## 2020-07-12 PROCEDURE — 81025 URINE PREGNANCY TEST: CPT

## 2020-07-12 PROCEDURE — 36415 COLL VENOUS BLD VENIPUNCTURE: CPT

## 2020-07-12 PROCEDURE — 96375 TX/PRO/DX INJ NEW DRUG ADDON: CPT

## 2020-07-12 PROCEDURE — 86140 C-REACTIVE PROTEIN: CPT

## 2020-07-12 PROCEDURE — 84443 ASSAY THYROID STIM HORMONE: CPT

## 2020-07-12 PROCEDURE — 96361 HYDRATE IV INFUSION ADD-ON: CPT

## 2020-07-12 PROCEDURE — 83735 ASSAY OF MAGNESIUM: CPT

## 2020-07-12 PROCEDURE — 81001 URINALYSIS AUTO W/SCOPE: CPT

## 2020-07-12 PROCEDURE — 96374 THER/PROPH/DIAG INJ IV PUSH: CPT

## 2020-07-12 PROCEDURE — 85025 COMPLETE CBC W/AUTO DIFF WBC: CPT

## 2020-07-12 PROCEDURE — 80053 COMPREHEN METABOLIC PANEL: CPT

## 2020-07-12 PROCEDURE — 87086 URINE CULTURE/COLONY COUNT: CPT

## 2020-07-12 PROCEDURE — 83516 IMMUNOASSAY NONANTIBODY: CPT

## 2020-07-12 PROCEDURE — 83690 ASSAY OF LIPASE: CPT

## 2020-07-12 PROCEDURE — 81003 URINALYSIS AUTO W/O SCOPE: CPT

## 2020-07-12 NOTE — ED PHYSICIAN DOCUMENTATION
PD HPI NVD





- Stated complaint


Stated Complaint: N/V





- History obtained from


History obtained from: Patient





- History of Present Illness


Timing - onset: How many weeks ago (almost 3 weeks ago, persistent loose stools 

(oatmeal to watery, without blood) and intermittent frequent cramps, and 

nausea.)


Timing - duration: Weeks (She has had several months of crampy generalized 

abdominal pain associated with loose stool and nausea.  She had been seen in the

ER 3 previous times and treated with IV fluids and antiemetics.  A visit in 

early May had tenderness concerning enough to warrant a CT scan that showed 

local colitis)


Timing - details: Gradual onset, Still present (Worse the past several days to a

week), Waxing and waning


Associated symptoms: Abdominal pain (diffuse crampy), Dizzy (She has been 

feeling lightheaded and generally weak the last several days with nausea and 

poor intake because of it. She has had loose stool several times daily over the 

past several days to week.  It is not watery necessarily but more like loose 

oatmeal.).  No: Fever, Melena, Hematochezia, Near syncope / syncope


Contributing factors: Recent antibiotics (Augmentin  about 2 months ago in 

treatment of colitis by CT, with symptoms similar that led up to the ER visits 

and scan.).  No: Sick contact, Bad food, Travel, Anticoagulated


Improved by: No: Eating, BM


Worsened by: Eating.  No: Palpation


Similar symptoms before: No diagnosis (She has had similar symptoms episodic 

over the last few months lasting a week or 2 at a time but has never fully 

resolved in between times.  The current episode the past 3 weeks is the longest)


Recently seen: Clinic (She did have a visit virtually with her primary care and 

was to get a GI referral to [facility] but the referral is still in process

now the past 2 to 3 weeks), Emergency Dept (Visits for similar in March, April 

and May)





Review of Systems


Constitutional: reports: Myalgias, Fatigue, Weight Loss (She has gone from 110 

pounds down to 85 and had started gaining weight again after the last ER visit 

and medication from that but is losing several pounds again the past week).  

denies: Fever, Chills


Nose: denies: Rhinorrhea / runny nose, Congestion


Throat: denies: Sore throat


Respiratory: denies: Cough


GI: reports: Abdominal Pain (crampy diffuse intermittent), Nausea, Diarrhea.  

denies: Abdominal Swelling, Vomiting, Constipation, Hematemesis, Bloody / black 

stool


: denies: Dysuria


Musculoskeletal: denies: Neck pain, Back pain


Neurologic: reports: Generalized weakness, Near syncope.  denies: Focal 

weakness, Numbness, Syncope, Altered mental status, Headache





PD PAST MEDICAL HISTORY





- Past Medical History


Cardiovascular: None


Respiratory: None


Neuro: None


Endocrine/Autoimmune: None


GI: None (no known Dx of colitis or immune disorder. )





- Past Surgical History


Past Surgical History: No





- Present Medications


Home Medications: 


                                Ambulatory Orders











 Medication  Instructions  Recorded  Confirmed


 


Amox/Clav 875/125 [Augmentin] 1 each PO Q12H #20 tablet 04/27/20 


 


Ondansetron Odt [Zofran] 4 mg TL Q6H PRN #10 tablet 04/27/20 


 


Ondansetron Odt [Zofran] 4 mg TL Q6H PRN #20 tablet 05/02/20 


 


Loperamide [Imodium] 2 mg PO Q6H PRN #30 capsule 07/12/20 


 


Naproxen 375 mg PO BID #20 tablet 07/12/20 


 


Ondansetron Odt [Zofran] 4 mg TL Q6H PRN #25 tablet 07/12/20 


 


Saccharomyces Boulardii [Florastor] 250 mg PO BID #30 capsule 07/12/20 


 


metroNIDAZOLE [Flagyl] 250 mg PO BID #14 tablet 07/12/20 














- Allergies


Allergies/Adverse Reactions: 


                                    Allergies











Allergy/AdvReac Type Severity Reaction Status Date / Time


 


No Known Drug Allergies Allergy   Verified 07/12/20 15:22














- Social History


Does the pt smoke?: No


Smoking Status: Never smoker


Does the pt drink ETOH?: Yes


Does the pt have substance abuse?: No





- Immunizations


Immunizations are current?: Yes





- POLST


Patient has POLST: No





PD ED PE NORMAL





- Vitals


Vital signs reviewed: Yes





- General


General: Alert and oriented X 3, No acute distress, Well developed/nourished





- HEENT


HEENT: Pharynx benign.  No: Moist mucous membranes





- Neck


Neck: Supple, no meningeal sign, No adenopathy





- Cardiac


Cardiac: RRR, No murmur





- Respiratory


Respiratory: Clear bilaterally





- Abdomen


Abdomen: Soft, Non distended, No organomegaly, Other (She has mild general 

tenderness mostly in the central periumbilical area and some in the upper mid 

abdomen.  She is not tender at all in the right lower quadrant area nor over the

 gallbladder area.  There is no back tenderness nor CVA tenderness.  There is no

 percussion or rebound tenderness).  No: Normal bowel sounds (increased 

diffusely)





- Female 


Female : Deferred





- Rectal


Rectal: Deferred





- Back


Back: No CVA TTP





- Derm


Derm: Normal color, Warm and dry





- Extremities


Extremities: No tenderness to palpate, Normal ROM s pain, No edema, No calf 

tenderness / cord





- Neuro


Neuro: Alert and oriented X 3, No motor deficit, Normal speech





Results





- Vitals


Vitals: 


                               Vital Signs - 24 hr











  07/12/20 07/12/20





  15:20 15:22


 


Temperature 37 C 


 


Heart Rate 96 75


 


Respiratory 18 16





Rate  


 


Blood Pressure 112/74 106/68


 


O2 Saturation 98 100








                                     Oxygen











O2 Source                      Room air

















- Labs


Labs: 


                                Laboratory Tests











  07/12/20 07/12/20 07/12/20





  15:54 15:54 15:54


 


WBC  6.2  


 


RBC  4.53  


 


Hgb  13.8  


 


Hct  40.3  


 


MCV  89.0  


 


MCH  30.5  


 


MCHC  34.2  


 


RDW  12.1  


 


Plt Count  230  


 


MPV  9.4  


 


Neut # (Auto)  4.6  


 


Lymph # (Auto)  1.3 L  


 


Mono # (Auto)  0.3  


 


Eos # (Auto)  0.0  


 


Baso # (Auto)  0.0  


 


Absolute Nucleated RBC  0.00  


 


Nucleated RBC %  0.0  


 


ESR   2 


 


Sodium    139


 


Potassium    3.8


 


Chloride    102


 


Carbon Dioxide    22


 


Anion Gap    15.0 H


 


BUN    17


 


Creatinine    0.6


 


Estimated GFR (MDRD)    126


 


Glucose    88


 


Calcium    9.6


 


Magnesium    2.3


 


Total Bilirubin    1.1 H


 


AST    19


 


ALT    26


 


Alkaline Phosphatase    53


 


C-Reactive Protein    < 1.0


 


Total Protein    7.2


 


Albumin    4.9


 


Globulin    2.3


 


Albumin/Globulin Ratio    2.1


 


Lipase    24


 


TSH   


 


Urine Color   


 


Urine Clarity   


 


Urine pH   


 


Ur Specific Gravity   


 


Urine Protein   


 


Urine Glucose (UA)   


 


Urine Ketones   


 


Urine Occult Blood   


 


Urine Nitrite   


 


Urine Bilirubin   


 


Urine Urobilinogen   


 


Ur Leukocyte Esterase   


 


Ur Microscopic Review   


 


Urine Culture Comments   


 


Urine HCG, Qual   














  07/12/20 07/12/20 07/12/20





  15:54 16:46 16:46


 


WBC   


 


RBC   


 


Hgb   


 


Hct   


 


MCV   


 


MCH   


 


MCHC   


 


RDW   


 


Plt Count   


 


MPV   


 


Neut # (Auto)   


 


Lymph # (Auto)   


 


Mono # (Auto)   


 


Eos # (Auto)   


 


Baso # (Auto)   


 


Absolute Nucleated RBC   


 


Nucleated RBC %   


 


ESR   


 


Sodium   


 


Potassium   


 


Chloride   


 


Carbon Dioxide   


 


Anion Gap   


 


BUN   


 


Creatinine   


 


Estimated GFR (MDRD)   


 


Glucose   


 


Calcium   


 


Magnesium   


 


Total Bilirubin   


 


AST   


 


ALT   


 


Alkaline Phosphatase   


 


C-Reactive Protein   


 


Total Protein   


 


Albumin   


 


Globulin   


 


Albumin/Globulin Ratio   


 


Lipase   


 


TSH  1.06  


 


Urine Color    YELLOW


 


Urine Clarity    CLEAR


 


Urine pH    5.5


 


Ur Specific Gravity   >=1.030 H  >=1.030 H


 


Urine Protein    NEGATIVE


 


Urine Glucose (UA)    NEGATIVE


 


Urine Ketones    TRACE


 


Urine Occult Blood    TRACE-LYSE


 


Urine Nitrite    NEGATIVE


 


Urine Bilirubin    NEGATIVE


 


Urine Urobilinogen    0.2 (NORMAL)


 


Ur Leukocyte Esterase    NEGATIVE


 


Ur Microscopic Review    NOT INDICATED


 


Urine Culture Comments    NOT INDICATED


 


Urine HCG, Qual   NEGATIVE 














PD MEDICAL DECISION MAKING





- ED course


Complexity details: reviewed old records, reviewed results, re-evaluated patient

 (She is feeling improved with IV fluids and antiemetics and has much less 

cramping with ketorolac IV.  She is unable to produce a stool sample here.  We 

will send her home with sample collection to assess for stool culture as well as

 C. difficile and fecal leukocytes.), considered differential (She has had 

several months of nausea cramps loose stool/diarrhea and notable weight loss.  

Consider immune disorders such as Crohn's or colitis or thyroid disorder, 

diabetes, malabsorption syndromes.)





Departure





- Departure


Disposition: 01 Home, Self Care


Clinical Impression: 


 Nausea vomiting and diarrhea, Abdominal cramping, Weight loss, Dehydration





Condition: Stable


Record reviewed to determine appropriate education?: Yes


Follow-Up: 


[facility] [Provider Group]


BIJAL BURNETT [Physician No Access] - 


Jake Hoover MD [Provider Admit Priv/Credential] - 


Prescriptions: 


metroNIDAZOLE [Flagyl] 250 mg PO BID #14 tablet


Saccharomyces Boulardii [Florastor] 250 mg PO BID #30 capsule


Loperamide [Imodium] 2 mg PO Q6H PRN #30 capsule


 PRN Reason: Diarrhea


Naproxen 375 mg PO BID #20 tablet


Ondansetron Odt [Zofran] 4 mg TL Q6H PRN #25 tablet


 PRN Reason: Nausea / Vomiting


Comments: 


Obtain sample of your loose stool/diarrhea before initiating any antidiarrhea or

 antibiotic medicines.  You can start the antiemetic (for nausea), probiotic, 

and anti-inflammatories however.





Once you have obtained a stool sample then you can start the other medicines as 

well. Bring the stool sample to your primary care to have it tested for C. 

Difficile, stool culture, ova and parasites, and fecal leukocytes. 





Use the naproxen anti-inflammatory, metronidazole antibiotic, Florastor 

probiotic as directed regularly for the next week.





Add ondansetron if needed for nausea and Imodium if needed for diarrhea.  Try 

eliminating lactose from your diet initially with consideration of possible 

malabsorption or food allergy causes.





Follow-up with gastroenterology for further evaluation.  I listed a couple of 

different provider groups in the instructions.  Call and see if they take your 

insurance.  Otherwise continue getting the referral through your primary care as

 well.